# Patient Record
(demographics unavailable — no encounter records)

---

## 2025-01-23 NOTE — HISTORY OF PRESENT ILLNESS
[FreeTextEntry1] : this is a 74 year old male pmh of HTN and family hx CAD ( father MI 80's, uncles MI 40-50's), former tobacco use. he recently had the flu and was treated by his PCP and found to have an abnormal ekg and referred to cardiology. 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 Does have some reduced stamina, now rides the cart golfing. He denies any chest pain episodes.   PCP: Dr. Donato   ==== data reviewed today=== EKG SR with APCs non specific inferior st/t changes  VS family hx reviewed

## 2025-01-23 NOTE — PHYSICAL EXAM
[Well Developed] : well developed [Well Nourished] : well nourished [No Acute Distress] : no acute distress [Normal Venous Pressure] : normal venous pressure [Normal S1, S2] : normal S1, S2 [Murmur] : murmur [de-identified] : soft I/IV systolic murmur

## 2025-01-23 NOTE — ASSESSMENT
[FreeTextEntry1] : Assessment this si a 74 year old hx of HTN and familial CAD here to establish CV care   # Abnormal EKG # CORDON  #HTN, controlled # familial CAD  # Former tobacco user  Plan: -  check stress echo  - former tobacco use abdominal ultrasound  - labs for lipid/a1c/ cbc/cmp/ pro BNP/ tsh  - SBP goal < 120/ 80  - recommended 2 g NA Mediterranean style diet - reduce caffeine intake - encourage dedicated exercise - Return to clinic in 3-4 months at [facility], Dr. Thomas, personally performed the evaluation and management (E/M) services for this new patient. That E/M includes conducting the clinically appropriate initial history &/or exam, assessing all conditions, and establishing the plan of care. Today, AFNG Huerta was here to observe &/or participate in the visit & follow plan of care established by me.

## 2025-01-23 NOTE — REVIEW OF SYSTEMS
[Dyspnea on exertion] : dyspnea during exertion [Fever] : no fever [Headache] : no headache [Chills] : no chills [Feeling Fatigued] : not feeling fatigued [Blurry Vision] : no blurred vision [Earache] : no earache [SOB] : no shortness of breath [Leg Claudication] : no intermittent leg claudication [Palpitations] : no palpitations [Cough] : no cough [Wheezing] : no wheezing [Abdominal Pain] : no abdominal pain [Nausea] : no nausea [Change in Appetite] : no change in appetite [Dizziness] : no dizziness [Tremor] : no tremor was seen [Convulsions] : no convulsions [Confusion] : no confusion was observed [Memory Lapses Or Loss] : no memory lapses or loss

## 2025-04-15 NOTE — HISTORY OF PRESENT ILLNESS
[FreeTextEntry1] : 74-year-old male PMH: HTN and family hx CAD (father MI 80's, uncles MI 40-50's), former tobacco use.  PCP: Dr. Donato  4/15/25 No chest pain, shortness of breath, palpitations, dizziness/lightheadedness, pre-syncope/syncope, or LE swelling.  No longer experiencing CORDON, patient has since recovered from the flu.   1/23/25 He recently had the flu and was treated by his PCP and found to have an abnormal EKG and referred to cardiology. 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 Does have some reduced stamina, now rides the cart golfing. He denies any chest pain episodes.

## 2025-04-15 NOTE — PHYSICAL EXAM
[Well Developed] : well developed [Well Nourished] : well nourished [No Acute Distress] : no acute distress [Normal Venous Pressure] : normal venous pressure [Normal S1, S2] : normal S1, S2 [Murmur] : murmur [de-identified] : soft I/IV systolic murmur

## 2025-04-15 NOTE — REVIEW OF SYSTEMS
[Fever] : no fever [Headache] : no headache [Chills] : no chills [Feeling Fatigued] : not feeling fatigued [Blurry Vision] : no blurred vision [Earache] : no earache [SOB] : no shortness of breath [Dyspnea on exertion] : not dyspnea during exertion [Leg Claudication] : no intermittent leg claudication [Palpitations] : no palpitations [Cough] : no cough [Wheezing] : no wheezing [Abdominal Pain] : no abdominal pain [Nausea] : no nausea [Change in Appetite] : no change in appetite [Dizziness] : no dizziness [Tremor] : no tremor was seen [Convulsions] : no convulsions [Confusion] : no confusion was observed [Memory Lapses Or Loss] : no memory lapses or loss

## 2025-04-15 NOTE — ASSESSMENT
[FreeTextEntry1] : # Abnormal EKG # CORDON  - DEVEN 2/19/2025: Normal exercise stress echocardiogram with normal augmentation of left ventricular systolic function. Equivocal stress ECG, baseline changes. Normal echo LVEF 59%  #HTN, controlled #FMH of CAD  # Former tobacco user - US Abdominal Aorta: Mildly dilated abd aorta 2.4cm x 2.48cm x 2.41cm. Dilated right SHALINI 1.28cm x 1.41cm x 1.38cm. Dilated right EIA 1.19cm x 1.21cm x 122.cm. Left SHALINI tortuous and dilated 1.62cm x 1.51cm x 1.5cm. Dilated left EIA 1.27cm x 1.29cm x 1.27cm.  #Ascending aortic aneurysm 4.7 cm ((2.1cm/m2) on 2/2025 echo - Tricuspid AV with trace AI 2/2025 1/27/25 A1c 5.7%  Pro BNP 62 TSH 2.19  K 5.5, Cr 1.3, GFR 58 Alk Phos 129, ST 18, ALT 13  Calcium 10.9  , TG 80, HDL 51,  -not on statin therapy  Plan: - CTA aorta chest to further assess thoracic aortic aneurysm - Start rosuva statin 10 mg daily, discussed SEs, labs in 12 weeks - Continue losartan 100 mg daily and amlodipine 5 mg daily  - SBP goal < 120/ 80  - recommended 2 g NA Mediterranean style diet - reduce caffeine intake - encourage dedicated exercise - Return to clinic in 6 months   I, Dr. Thomas, personally performed the evaluation and management (E/M) services for this established patient who presents today with (a) new problem(s)/exacerbation of (an) existing condition(s). That E/M includes conducting the clinically appropriate interval history &/or exam, assessing all new/exacerbated conditions, and establishing a new plan of care. Today, FANG Lin was here to observe &/or participate in the visit & follow plan of care established by me.